# Patient Record
Sex: FEMALE | Race: WHITE | NOT HISPANIC OR LATINO | Employment: FULL TIME | URBAN - METROPOLITAN AREA
[De-identification: names, ages, dates, MRNs, and addresses within clinical notes are randomized per-mention and may not be internally consistent; named-entity substitution may affect disease eponyms.]

---

## 2017-02-07 ENCOUNTER — ANESTHESIA EVENT (OUTPATIENT)
Dept: GASTROENTEROLOGY | Facility: HOSPITAL | Age: 42
End: 2017-02-07

## 2017-02-07 ENCOUNTER — ANESTHESIA (OUTPATIENT)
Dept: GASTROENTEROLOGY | Facility: HOSPITAL | Age: 42
End: 2017-02-07

## 2017-02-07 PROCEDURE — 25010000002 PROPOFOL 1000 MG/ML EMULSION: Performed by: ANESTHESIOLOGY

## 2017-02-07 PROCEDURE — 25010000002 PROPOFOL 10 MG/ML EMULSION: Performed by: ANESTHESIOLOGY

## 2017-02-07 RX ORDER — PROPOFOL 10 MG/ML
VIAL (ML) INTRAVENOUS AS NEEDED
Status: DISCONTINUED | OUTPATIENT
Start: 2017-02-07 | End: 2017-02-07 | Stop reason: SURG

## 2017-02-07 RX ADMIN — PROPOFOL 100 MG: 10 INJECTION, EMULSION INTRAVENOUS at 09:31

## 2017-02-07 RX ADMIN — PROPOFOL 140 MCG/KG/MIN: 10 INJECTION, EMULSION INTRAVENOUS at 09:31

## 2017-02-07 RX ADMIN — PROPOFOL 75 MG: 10 INJECTION, EMULSION INTRAVENOUS at 09:40

## 2017-02-07 RX ADMIN — PROPOFOL 50 MG: 10 INJECTION, EMULSION INTRAVENOUS at 09:33

## 2017-02-07 NOTE — ANESTHESIA POSTPROCEDURE EVALUATION
Patient: Justine Lin    Procedure Summary     Date Anesthesia Start Anesthesia Stop Room / Location    02/07/17 0926 1005  [facility]       Procedure Diagnosis Surgeon Provider    COLONOSCOPY INTO T.I. WITH COLD BIOPSY POLYPECTOMY & HOT SNARE POLYPECTOMY WITH 3 CC BETTY INK INJECTION TO RECTOSIGMOID POLYPECTOMY SITE & RESOLUTION CLIP X 1 (N/A ) Blood in stool  (Blood in stool [K92.1]) MD Alex Herrera MD          Anesthesia Type: MAC  Last vitals  /54 (02/07/17 1005)    Temp      Pulse 58 (02/07/17 1005)   Resp 16 (02/07/17 1005)    SpO2 100 % (02/07/17 1005)      Post Anesthesia Care and Evaluation    Patient location during evaluation: PACU  Patient participation: complete - patient participated  Level of consciousness: awake and alert  Pain management: adequate  Airway patency: patent  Anesthetic complications: No anesthetic complications    Cardiovascular status: acceptable  Respiratory status: acceptable  Hydration status: acceptable

## 2017-02-07 NOTE — ANESTHESIA PREPROCEDURE EVALUATION
Anesthesia Evaluation     Patient summary reviewed and Nursing notes reviewed   no history of anesthetic complications:  NPO Status: > 8 hours   Airway   Mallampati: II  TM distance: >3 FB  Neck ROM: full  no difficulty expected  Dental - normal exam     Pulmonary - negative pulmonary ROS    breath sounds clear to auscultation  (-) shortness of breath, sleep apnea, decreased breath sounds, wheezes  Cardiovascular - normal exam  Exercise tolerance: good (4-7 METS)    Rhythm: regular  Rate: normal    (-) past MI, angina, CHF, orthopnea, PND, GONZALEZ, PVD      Neuro/Psych  (-) seizures, neuromuscular disease, TIA, CVA, dizziness/light headedness, weakness, numbness  GI/Hepatic/Renal/Endo    (+) obesity,  GERD,   (-) liver disease, renal disease, diabetes    Musculoskeletal (-) negative ROS    Abdominal  - normal exam   Substance History - negative use  (-) alcohol use, drug use     OB/GYN negative ob/gyn ROS         Other - negative ROS                                 Anesthesia Plan    ASA 2     MAC     intravenous induction   Anesthetic plan and risks discussed with patient.

## 2017-02-10 ENCOUNTER — TELEPHONE (OUTPATIENT)
Dept: GASTROENTEROLOGY | Facility: CLINIC | Age: 42
End: 2017-02-10

## 2017-02-10 NOTE — TELEPHONE ENCOUNTER
Call to pt.  Advise per Dr Jackson that 1 tubulovillous adenoma and 1 tubular adenoma were found on c/s and removed.  No cancer was identified.  In the asence of new symptoms (bloodd in stool, changes in stool habits, etc), it is recommend that next c/s be performed in 3 yrs.  If new symptoms develop, contact PCP for eval.    Pt verb understanding.    Message to Indigo GONZALEZ for 2/8/20 c/s recall.

## 2017-02-10 NOTE — TELEPHONE ENCOUNTER
----- Message from Nannette Jackson MD sent at 2/9/2017  6:57 PM EST -----  2/7/17 colonoscopy path results:  1 tubulovillous adenoma and 1 tubular adenoma were found on your colonoscopy and were removed.  No cancer was identified in the tissue.  In the absence of new symptoms (blood in stool, changes in stool habits, etc.), it is recommended that your next colonoscopy be performed in 3 years.  If you do develop new symptoms, please contact your primary care physician for evaluation.    Ref: Guidelines for Colonoscopy Surveillance After Screening and Polypectomy: A Consensus Update by the [place] Multi-Society Task Force on Colorectal Cancer.   Gastroenterology.  2012;143:844-857    Pls place in 3 year recall, thanks

## 2017-02-21 RX ORDER — OSELTAMIVIR PHOSPHATE 75 MG/1
75 CAPSULE ORAL DAILY
Qty: 10 CAPSULE | Refills: 0 | Status: SHIPPED | OUTPATIENT
Start: 2017-02-21 | End: 2018-02-14

## 2017-08-25 ENCOUNTER — OFFICE VISIT (OUTPATIENT)
Dept: FAMILY MEDICINE CLINIC | Facility: CLINIC | Age: 42
End: 2017-08-25

## 2017-08-25 VITALS
DIASTOLIC BLOOD PRESSURE: 80 MMHG | BODY MASS INDEX: 30.31 KG/M2 | WEIGHT: 200 LBS | HEIGHT: 68 IN | TEMPERATURE: 99.3 F | SYSTOLIC BLOOD PRESSURE: 130 MMHG | RESPIRATION RATE: 16 BRPM | OXYGEN SATURATION: 99 % | HEART RATE: 72 BPM

## 2017-08-25 DIAGNOSIS — Z80.3 FAMILY HISTORY OF BREAST CANCER IN FEMALE: ICD-10-CM

## 2017-08-25 DIAGNOSIS — R53.83 FATIGUE, UNSPECIFIED TYPE: Primary | ICD-10-CM

## 2017-08-25 DIAGNOSIS — Z80.3 FAMILY HISTORY OF BREAST CANCER IN MOTHER: ICD-10-CM

## 2017-08-25 DIAGNOSIS — Z80.42 FAMILY HISTORY OF PROSTATE CANCER: ICD-10-CM

## 2017-08-25 PROBLEM — Z01.419 WELL WOMAN EXAM WITH ROUTINE GYNECOLOGICAL EXAM: Status: ACTIVE | Noted: 2017-07-27

## 2017-08-25 PROBLEM — Z97.5 IUD CONTRACEPTION: Status: ACTIVE | Noted: 2017-07-27

## 2017-08-25 PROCEDURE — 99213 OFFICE O/P EST LOW 20 MIN: CPT | Performed by: FAMILY MEDICINE

## 2017-08-25 NOTE — PROGRESS NOTES
"Subjective   Justine Lin is a 41 y.o. female.     History of Present Illness   Justine is here w/ a swollen node under her Lt arm.  She states it was big enough that you could actually see it.  She recently had a Bartholin cyst  And was treated with antibiotic.  Justine states 4 yrs ago she had mono and since that time she has not \"felt right\".  She states she had talked to Dr Gibson about this several times and her lab work all seems fine.  She does not feel well.  She got a Mirena during this time period as well.    She also has an extensive hx of cancer in her family and would like genetic testing.      The following portions of the patient's history were reviewed and updated as appropriate: allergies, current medications, past medical history, past social history and problem list.    Review of Systems   Constitutional: Positive for fatigue and fever.   All other systems reviewed and are negative.      Objective   Physical Exam   Constitutional: She is oriented to person, place, and time. She appears well-developed.   HENT:   Head: Normocephalic and atraumatic.   Eyes: EOM are normal. Pupils are equal, round, and reactive to light.   Cardiovascular: Normal rate, regular rhythm and normal heart sounds.    Pulmonary/Chest: Effort normal and breath sounds normal.   Neurological: She is alert and oriented to person, place, and time.   Skin: Skin is warm and dry.   Nursing note and vitals reviewed.      Assessment/Plan   Diagnoses and all orders for this visit:    Fatigue, unspecified type  -     CBC (No Diff)  -     TSH  -     Vitamin B12 & Folate    Family history of breast cancer in mother  -     Ambulatory Referral to Genetics    Family history of breast cancer in female  -     Ambulatory Referral to Genetics    Family history of prostate cancer  -     Ambulatory Referral to Genetics               "

## 2017-08-29 ENCOUNTER — TELEPHONE (OUTPATIENT)
Dept: FAMILY MEDICINE CLINIC | Facility: CLINIC | Age: 42
End: 2017-08-29

## 2017-08-29 LAB
FOLATE SERPL-MCNC: 4.1 NG/ML
TSH SERPL DL<=0.005 MIU/L-ACNC: 0.8 UIU/ML (ref 0.45–4.5)
VIT B12 SERPL-MCNC: 312 PG/ML (ref 211–946)

## 2017-08-29 NOTE — TELEPHONE ENCOUNTER
Pt called to say that during her lab draw 8/25 she experienced sharp, shooting pain in the arm that was being used.  She states after the needle was removed she had a hot, burning sensation in that arm.  She states she called the on call MD over the weekend and was advised to go to the ER but did not feel like this was necessary,  She states ice, Ibu and ace bandage seemk to help.  She states sx seem to be getting better.  Advised pt that if sx worsen or fail to improve she should be seen

## 2017-08-31 LAB
HCT VFR BLD AUTO: NORMAL %
HGB BLD-MCNC: NORMAL G/DL
PLATELET # BLD AUTO: NORMAL 10*3/UL
RBC # BLD AUTO: NORMAL 10*6/UL
WBC # BLD AUTO: NORMAL X10E3/UL (ref 3.4–10.8)

## 2017-09-14 ENCOUNTER — TELEPHONE (OUTPATIENT)
Dept: FAMILY MEDICINE CLINIC | Facility: CLINIC | Age: 42
End: 2017-09-14

## 2017-09-15 ENCOUNTER — TELEPHONE (OUTPATIENT)
Dept: FAMILY MEDICINE CLINIC | Facility: CLINIC | Age: 42
End: 2017-09-15

## 2017-09-15 NOTE — TELEPHONE ENCOUNTER
Patient called and left a message stating that she still has decreased mobility from a recent blood draw at our office. She was contacted by [facility] about a CBC that was not completed. She state that she does not wish to pursue additional blood work at this time.

## 2018-02-14 ENCOUNTER — OFFICE VISIT (OUTPATIENT)
Dept: FAMILY MEDICINE CLINIC | Facility: CLINIC | Age: 43
End: 2018-02-14

## 2018-02-14 VITALS
BODY MASS INDEX: 31.22 KG/M2 | SYSTOLIC BLOOD PRESSURE: 110 MMHG | TEMPERATURE: 98.2 F | HEART RATE: 71 BPM | WEIGHT: 206 LBS | OXYGEN SATURATION: 98 % | HEIGHT: 68 IN | DIASTOLIC BLOOD PRESSURE: 70 MMHG

## 2018-02-14 DIAGNOSIS — J01.00 ACUTE NON-RECURRENT MAXILLARY SINUSITIS: Primary | ICD-10-CM

## 2018-02-14 DIAGNOSIS — R20.0 ARM NUMBNESS: ICD-10-CM

## 2018-02-14 PROCEDURE — 99213 OFFICE O/P EST LOW 20 MIN: CPT | Performed by: NURSE PRACTITIONER

## 2018-02-14 RX ORDER — AMOXICILLIN 875 MG/1
875 TABLET, COATED ORAL EVERY 12 HOURS SCHEDULED
Qty: 20 TABLET | Refills: 0 | Status: SHIPPED | OUTPATIENT
Start: 2018-02-14 | End: 2018-03-08

## 2018-02-14 NOTE — PROGRESS NOTES
"Justine Lin is a 42 y.o. female.Patinet presents with facial pain and ear pain that has been present for 3 days. Pain is sharp. Using Sudafed. She has had fatigue and nasal congestion for two weeks.    Patient states that she had a blood draw in August in our office and states that she still has pain and tinglng in the arm. Thinks she had her nerve knicked her nerve and is getting better    Assessment/Plan   Problem List Items Addressed This Visit     None      Visit Diagnoses     Acute non-recurrent maxillary sinusitis    -  Primary    Arm numbness                 No Follow-up on file.  There are no Patient Instructions on file for this visit.    Chief Complaint   Patient presents with   • Sinusitis     Social History   Substance Use Topics   • Smoking status: Never Smoker   • Smokeless tobacco: None   • Alcohol use No       History of Present Illness     The following portions of the patient's history were reviewed and updated as appropriate:PMHroutine: Social history , Allergies, Current Medications and Active Problem List    Review of Systems   Constitutional: Positive for fatigue.   HENT: Positive for sinus pain and sinus pressure.        Objective   Vitals:    02/14/18 0942   BP: 110/70   Pulse: 71   Temp: 98.2 °F (36.8 °C)   SpO2: 98%   Weight: 93.4 kg (206 lb)   Height: 172.7 cm (68\")     Body mass index is 31.32 kg/(m^2).  Physical Exam   Constitutional: She appears well-developed and well-nourished. No distress.   HENT:   Head: Normocephalic and atraumatic.   Right Ear: External ear normal.   Left Ear: External ear normal.   Op red with drainage  + pain and pressure to maxillary sinuses   Eyes: EOM are normal.   Neck: Neck supple. No thyromegaly present.   Cardiovascular: Normal rate, regular rhythm and normal heart sounds.    Pulmonary/Chest: Effort normal and breath sounds normal.   Musculoskeletal: Normal range of motion.   Neurological: She is alert.   Skin: Skin is warm.   Nursing note and " vitals reviewed.    Reviewed Data:  No visits with results within 1 Month(s) from this visit.  Latest known visit with results is:    Office Visit on 08/25/2017   Component Date Value Ref Range Status   • WBC 08/25/2017 Comment  3.4 - 10.8 x10E3/uL Final    Comment: [facility] will contact your patient to schedule a time for recollection.  Should we be unable to make contact with your patient, your office  will be notified.  Test not performed. Deterioration occurred during specimen handling.     • RBC 08/25/2017 CANCELED   Final-Edited    Comment: Test not performed    Result canceled by the ancillary     • Hemoglobin 08/25/2017 CANCELED   Final-Edited    Comment: Test not performed    Result canceled by the ancillary     • Hematocrit 08/25/2017 CANCELED   Final-Edited    Comment: Test not performed    Result canceled by the ancillary     • Platelets 08/25/2017 CANCELED   Final-Edited    Comment: Test not performed    Result canceled by the ancillary     • TSH 08/25/2017 0.795  0.450 - 4.500 uIU/mL Final   • Vitamin B-12 08/25/2017 312  211 - 946 pg/mL Final   • Folate 08/25/2017 4.1  >3.0 ng/mL Final    Comment: A serum folate concentration of less than 3.1 ng/mL is  considered to represent clinical deficiency.     otc magnesium for muscle pain

## 2018-03-01 ENCOUNTER — TELEPHONE (OUTPATIENT)
Dept: FAMILY MEDICINE CLINIC | Facility: CLINIC | Age: 43
End: 2018-03-01

## 2018-03-01 NOTE — TELEPHONE ENCOUNTER
Patient was seen for a sinus infection on 2/14/18. She states that she she finished her ATB and had two days that she felt better. Symptoms are returning. Please advise.

## 2018-03-07 ENCOUNTER — TELEPHONE (OUTPATIENT)
Dept: FAMILY MEDICINE CLINIC | Facility: CLINIC | Age: 43
End: 2018-03-07

## 2018-03-08 ENCOUNTER — OFFICE VISIT (OUTPATIENT)
Dept: FAMILY MEDICINE CLINIC | Facility: CLINIC | Age: 43
End: 2018-03-08

## 2018-03-08 VITALS
WEIGHT: 205 LBS | OXYGEN SATURATION: 99 % | TEMPERATURE: 98.6 F | HEART RATE: 84 BPM | BODY MASS INDEX: 31.07 KG/M2 | HEIGHT: 68 IN | SYSTOLIC BLOOD PRESSURE: 114 MMHG | DIASTOLIC BLOOD PRESSURE: 70 MMHG | RESPIRATION RATE: 16 BRPM

## 2018-03-08 DIAGNOSIS — R06.02 SHORTNESS OF BREATH: ICD-10-CM

## 2018-03-08 DIAGNOSIS — J30.2 CHRONIC SEASONAL ALLERGIC RHINITIS, UNSPECIFIED TRIGGER: ICD-10-CM

## 2018-03-08 DIAGNOSIS — J45.21 MILD INTERMITTENT REACTIVE AIRWAY DISEASE WITH ACUTE EXACERBATION: Primary | ICD-10-CM

## 2018-03-08 PROCEDURE — 96372 THER/PROPH/DIAG INJ SC/IM: CPT | Performed by: FAMILY MEDICINE

## 2018-03-08 PROCEDURE — 94640 AIRWAY INHALATION TREATMENT: CPT | Performed by: FAMILY MEDICINE

## 2018-03-08 PROCEDURE — 99214 OFFICE O/P EST MOD 30 MIN: CPT | Performed by: FAMILY MEDICINE

## 2018-03-08 RX ORDER — METHYLPREDNISOLONE 4 MG/1
TABLET ORAL
Qty: 21 TABLET | Refills: 0 | Status: SHIPPED | OUTPATIENT
Start: 2018-03-08 | End: 2018-03-14

## 2018-03-08 RX ORDER — AZITHROMYCIN 250 MG/1
TABLET, FILM COATED ORAL
Qty: 6 TABLET | Refills: 0 | Status: SHIPPED | OUTPATIENT
Start: 2018-03-08 | End: 2018-03-14

## 2018-03-08 RX ORDER — ALBUTEROL SULFATE 90 UG/1
2 AEROSOL, METERED RESPIRATORY (INHALATION) EVERY 4 HOURS PRN
Qty: 18 G | Refills: 4 | Status: SHIPPED | OUTPATIENT
Start: 2018-03-08 | End: 2018-03-14

## 2018-03-08 RX ORDER — TRIAMCINOLONE ACETONIDE 40 MG/ML
40 INJECTION, SUSPENSION INTRA-ARTICULAR; INTRAMUSCULAR ONCE
Status: COMPLETED | OUTPATIENT
Start: 2018-03-08 | End: 2018-03-08

## 2018-03-08 RX ORDER — ALBUTEROL SULFATE 2.5 MG/3ML
2.5 SOLUTION RESPIRATORY (INHALATION) EVERY 6 HOURS PRN
Status: DISCONTINUED | OUTPATIENT
Start: 2018-03-08 | End: 2018-03-14

## 2018-03-08 RX ADMIN — ALBUTEROL SULFATE 2.5 MG: 2.5 SOLUTION RESPIRATORY (INHALATION) at 12:44

## 2018-03-08 RX ADMIN — TRIAMCINOLONE ACETONIDE 40 MG: 40 INJECTION, SUSPENSION INTRA-ARTICULAR; INTRAMUSCULAR at 12:50

## 2018-03-08 NOTE — PROGRESS NOTES
Subjective   Justine Lin is a 42 y.o. female.     History of Present Illness   Justine is here w/ chest tightness, wheezing and cough.  She had antibiotic 3 weeks ago for sinusitis.  She states those sx got better and now her sx are in her chest.  She states nothing seems to make it better and talking seems to make it worse.  She states last night she had chest tightness and pain and contemplated the ER.  She states she has taken a multitude of otc medications w/o much relief. She has had this kind of illness 3 times over the past 5 years, she thinks, and she is feeling pretty miserable.     The following portions of the patient's history were reviewed and updated as appropriate: allergies, current medications, past medical history, past social history and problem list.    Review of Systems   Respiratory: Positive for cough, chest tightness, shortness of breath and wheezing.    All other systems reviewed and are negative.      Objective   Physical Exam   Constitutional: She is oriented to person, place, and time. She appears well-developed and well-nourished. No distress.   HENT:   Head: Normocephalic and atraumatic.   Right Ear: External ear normal.   Left Ear: External ear normal.   Nose: Nose normal.   Mouth/Throat: Oropharynx is clear and moist. No oropharyngeal exudate.   Eyes: Conjunctivae and EOM are normal. Pupils are equal, round, and reactive to light.   Neck: Normal range of motion.   Cardiovascular: Normal rate and regular rhythm.    Pulmonary/Chest: Effort normal and breath sounds normal. No stridor. No respiratory distress. She has no wheezes. She has no rales. She exhibits no tenderness.   Lymphadenopathy:     She has no cervical adenopathy.   Neurological: She is alert and oriented to person, place, and time.   Skin: Skin is warm and dry. No rash noted.   Nursing note and vitals reviewed.      Assessment/Plan   Justine was seen today for asthma.    Diagnoses and all orders for this  "visit:    Mild intermittent reactive airway disease with acute exacerbation  -     Ambulatory Referral to Allergy  -     azithromycin (ZITHROMAX) 250 MG tablet; Take 2 tablets the first day, then 1 tablet daily for 4 days.  -     MethylPREDNISolone (MEDROL, CORINA,) 4 MG tablet; Take as directed on package instructions.  -     albuterol (PROVENTIL HFA;VENTOLIN HFA) 108 (90 Base) MCG/ACT inhaler; Inhale 2 puffs Every 4 (Four) Hours As Needed for Wheezing.  -     triamcinolone acetonide (KENALOG-40) injection 40 mg; Inject 1 mL into the shoulder, thigh, or buttocks 1 (One) Time.    Shortness of breath  -     azithromycin (ZITHROMAX) 250 MG tablet; Take 2 tablets the first day, then 1 tablet daily for 4 days.  -     MethylPREDNISolone (MEDROL, CORINA,) 4 MG tablet; Take as directed on package instructions.  -     albuterol (PROVENTIL) nebulizer solution 0.083% 2.5 mg/3mL; Take 2.5 mg by nebulization Every 6 (Six) Hours As Needed for Shortness of Air.    Chronic seasonal allergic rhinitis, unspecified trigger  -     Ambulatory Referral to Allergy         After a breathing treatment and Kenolog injection, she began to feel faint and woozy.She was tachycardic, regular,  to 120.O2 sat was 98% on RA. She did not pass out or loose consciousness. After being monitored for about 10 minutes, she had a HR in the low 70's that was regular and she was talking, in good spirits but felt weak and \"drained\". She was able to ambulate without assistance,  and was able to go home with  at 11:37 am  I advised calling EMS if she has further problems at home. I have advised starting Zpak but waiting on steroid corina until tomorrow. Advised of possible side effects and advised to d/c and call me if she has problems.   "

## 2018-03-08 NOTE — TELEPHONE ENCOUNTER
Left message for her to call back with name of medication. Patient is coming in for appt. Today discussing asthma.

## 2018-03-08 NOTE — PATIENT INSTRUCTIONS
I have started you on a Zpak and a Medrol DosePak. Please take with food.Wait to start your Medrol jose tomorrow.  I have also prescribed an inhaler for you to use every 6 hrs as needed.  I have referred you to  for evaluation.  Please go to the ER if you get worse.

## 2018-03-09 ENCOUNTER — HOSPITAL ENCOUNTER (EMERGENCY)
Facility: HOSPITAL | Age: 43
Discharge: HOME | End: 2018-03-10
Attending: EMERGENCY MEDICINE | Admitting: EMERGENCY MEDICINE

## 2018-03-09 ENCOUNTER — APPOINTMENT (OUTPATIENT)
Dept: GENERAL RADIOLOGY | Facility: HOSPITAL | Age: 43
End: 2018-03-09

## 2018-03-09 DIAGNOSIS — R93.89 ABNORMAL CHEST CT: ICD-10-CM

## 2018-03-09 DIAGNOSIS — R06.00 DYSPNEA, UNSPECIFIED TYPE: Primary | ICD-10-CM

## 2018-03-09 LAB
ALBUMIN SERPL-MCNC: 4.7 G/DL (ref 3.5–5.2)
ALBUMIN/GLOB SERPL: 1.7 G/DL
ALP SERPL-CCNC: 98 U/L (ref 39–117)
ALT SERPL W P-5'-P-CCNC: 12 U/L (ref 1–33)
ANION GAP SERPL CALCULATED.3IONS-SCNC: 12.3 MMOL/L
AST SERPL-CCNC: 15 U/L (ref 1–32)
BASOPHILS # BLD AUTO: 0.02 10*3/MM3 (ref 0–0.2)
BASOPHILS NFR BLD AUTO: 0.2 % (ref 0–1.5)
BILIRUB SERPL-MCNC: 0.2 MG/DL (ref 0.1–1.2)
BUN BLD-MCNC: 13 MG/DL (ref 6–20)
BUN/CREAT SERPL: 17.8 (ref 7–25)
CALCIUM SPEC-SCNC: 9.1 MG/DL (ref 8.6–10.5)
CHLORIDE SERPL-SCNC: 104 MMOL/L (ref 98–107)
CO2 SERPL-SCNC: 24.7 MMOL/L (ref 22–29)
CREAT BLD-MCNC: 0.73 MG/DL (ref 0.57–1)
DEPRECATED RDW RBC AUTO: 41.3 FL (ref 37–54)
EOSINOPHIL # BLD AUTO: 0.01 10*3/MM3 (ref 0–0.7)
EOSINOPHIL NFR BLD AUTO: 0.1 % (ref 0.3–6.2)
ERYTHROCYTE [DISTWIDTH] IN BLOOD BY AUTOMATED COUNT: 13 % (ref 11.7–13)
GFR SERPL CREATININE-BSD FRML MDRD: 87 ML/MIN/1.73
GLOBULIN UR ELPH-MCNC: 2.8 GM/DL
GLUCOSE BLD-MCNC: 173 MG/DL (ref 65–99)
HCG SERPL QL: NEGATIVE
HCT VFR BLD AUTO: 42.6 % (ref 35.6–45.5)
HGB BLD-MCNC: 14.5 G/DL (ref 11.9–15.5)
HOLD SPECIMEN: NORMAL
HOLD SPECIMEN: NORMAL
IMM GRANULOCYTES # BLD: 0.05 10*3/MM3 (ref 0–0.03)
IMM GRANULOCYTES NFR BLD: 0.4 % (ref 0–0.5)
LYMPHOCYTES # BLD AUTO: 1.14 10*3/MM3 (ref 0.9–4.8)
LYMPHOCYTES NFR BLD AUTO: 8.6 % (ref 19.6–45.3)
MCH RBC QN AUTO: 29.5 PG (ref 26.9–32)
MCHC RBC AUTO-ENTMCNC: 34 G/DL (ref 32.4–36.3)
MCV RBC AUTO: 86.6 FL (ref 80.5–98.2)
MONOCYTES # BLD AUTO: 0.32 10*3/MM3 (ref 0.2–1.2)
MONOCYTES NFR BLD AUTO: 2.4 % (ref 5–12)
NEUTROPHILS # BLD AUTO: 11.73 10*3/MM3 (ref 1.9–8.1)
NEUTROPHILS NFR BLD AUTO: 88.3 % (ref 42.7–76)
NT-PROBNP SERPL-MCNC: 21.2 PG/ML (ref 5–450)
PLATELET # BLD AUTO: 249 10*3/MM3 (ref 140–500)
PMV BLD AUTO: 10 FL (ref 6–12)
POTASSIUM BLD-SCNC: 4.3 MMOL/L (ref 3.5–5.2)
PROT SERPL-MCNC: 7.5 G/DL (ref 6–8.5)
RBC # BLD AUTO: 4.92 10*6/MM3 (ref 3.9–5.2)
SODIUM BLD-SCNC: 141 MMOL/L (ref 136–145)
TROPONIN T SERPL-MCNC: <0.01 NG/ML (ref 0–0.03)
WBC NRBC COR # BLD: 13.27 10*3/MM3 (ref 4.5–10.7)
WHOLE BLOOD HOLD SPECIMEN: NORMAL
WHOLE BLOOD HOLD SPECIMEN: NORMAL

## 2018-03-09 PROCEDURE — 84484 ASSAY OF TROPONIN QUANT: CPT | Performed by: EMERGENCY MEDICINE

## 2018-03-09 PROCEDURE — 71046 X-RAY EXAM CHEST 2 VIEWS: CPT

## 2018-03-09 PROCEDURE — 84703 CHORIONIC GONADOTROPIN ASSAY: CPT | Performed by: EMERGENCY MEDICINE

## 2018-03-09 PROCEDURE — 36415 COLL VENOUS BLD VENIPUNCTURE: CPT

## 2018-03-09 PROCEDURE — 93010 ELECTROCARDIOGRAM REPORT: CPT | Performed by: INTERNAL MEDICINE

## 2018-03-09 PROCEDURE — 99284 EMERGENCY DEPT VISIT MOD MDM: CPT

## 2018-03-09 PROCEDURE — 85025 COMPLETE CBC W/AUTO DIFF WBC: CPT | Performed by: EMERGENCY MEDICINE

## 2018-03-09 PROCEDURE — 93005 ELECTROCARDIOGRAM TRACING: CPT

## 2018-03-09 PROCEDURE — 80053 COMPREHEN METABOLIC PANEL: CPT | Performed by: EMERGENCY MEDICINE

## 2018-03-09 PROCEDURE — 83880 ASSAY OF NATRIURETIC PEPTIDE: CPT | Performed by: EMERGENCY MEDICINE

## 2018-03-09 PROCEDURE — 85379 FIBRIN DEGRADATION QUANT: CPT | Performed by: PHYSICIAN ASSISTANT

## 2018-03-09 RX ORDER — SODIUM CHLORIDE 0.9 % (FLUSH) 0.9 %
10 SYRINGE (ML) INJECTION AS NEEDED
Status: DISCONTINUED | OUTPATIENT
Start: 2018-03-09 | End: 2018-03-10 | Stop reason: HOSPADM

## 2018-03-10 ENCOUNTER — APPOINTMENT (OUTPATIENT)
Dept: CT IMAGING | Facility: HOSPITAL | Age: 43
End: 2018-03-10

## 2018-03-10 VITALS
BODY MASS INDEX: 30.31 KG/M2 | SYSTOLIC BLOOD PRESSURE: 130 MMHG | HEART RATE: 65 BPM | DIASTOLIC BLOOD PRESSURE: 81 MMHG | RESPIRATION RATE: 16 BRPM | TEMPERATURE: 98 F | OXYGEN SATURATION: 96 % | HEIGHT: 68 IN | WEIGHT: 200 LBS

## 2018-03-10 LAB — D DIMER PPP FEU-MCNC: 1.66 MCGFEU/ML (ref 0–0.49)

## 2018-03-10 PROCEDURE — 0 IOPAMIDOL PER 1 ML: Performed by: EMERGENCY MEDICINE

## 2018-03-10 PROCEDURE — 71275 CT ANGIOGRAPHY CHEST: CPT

## 2018-03-10 RX ADMIN — IOPAMIDOL 95 ML: 755 INJECTION, SOLUTION INTRAVENOUS at 00:45

## 2018-03-10 NOTE — DISCHARGE INSTRUCTIONS
Activities as tolerated.  Follow up as discussed.  Continue the Zithromax.  Return to the ER for  chest pain, feeling lightheaded or passing out, worsening shortness of breath, any concerns.

## 2018-03-10 NOTE — ED NOTES
Pt reports increasing SOB and productive cough, states she's been sick since Moore's Day. No improvement with abx and steroids. Seen by her PMD yesterday but not feeling much improvement even after getting steroid shot.     Mariana Chin RN  03/09/18 0997

## 2018-03-10 NOTE — ED PROVIDER NOTES
EMERGENCY DEPARTMENT ENCOUNTER    Room Number:    Date seen:  3/10/2018  Time seen: 11:46 PM  PCP: Eduardo Cárdenas MD    HPI:  Chief complaint: SOA  Context:Justine Lin is a 42 y.o. female who presents to the ED with c/o SOA for the past week that started after coughing. Pt states that she has felt generally ill for the past several weeks, and that she was found to had a sinus and ear infection in February, and was place on amoxicillin with transient improvement. Pt states that she has seen her PCP for her symptoms, and was given albuterol and a steroid in the office with minimal improvement. She was prescribed an inhaler, medrol pack and z-pack. After taking the steroids and inhaler tonight she felt very anxious and felt more SOA. Pt denies a Hx of asthma or other lung disease. Pt denies recent trauma or surgeries, periods of immobilization, or Hx of VTE, hemoptysis, trips or travel, leg swelling or calf pain. She does have a Mirena IUD.    Onset:gradual   Duration: 1 week  Timing: constant   Character: SOA   Aggravating Factors: none stated  Alleviating Factors: none stated   Severity: moderate      ALLERGIES  Codeine    PAST MEDICAL HISTORY  Active Ambulatory Problems     Diagnosis Date Noted   • Well woman exam with routine gynecological exam 2017   • Wrist pain, right 2013   • IUD contraception 2017   • Seasonal allergies 2018     Resolved Ambulatory Problems     Diagnosis Date Noted   • No Resolved Ambulatory Problems     Past Medical History:   Diagnosis Date   • GERD (gastroesophageal reflux disease)    • Reactive airway disease    • Seasonal allergies        PAST SURGICAL HISTORY  Past Surgical History:   Procedure Laterality Date   •  SECTION     • CHOLECYSTECTOMY     • COLONOSCOPY N/A 2017    Procedure: COLONOSCOPY INTO T.I. WITH COLD BIOPSY POLYPECTOMY & HOT SNARE POLYPECTOMY WITH 3 CC BETTY INK INJECTION TO RECTOSIGMOID POLYPECTOMY SITE & RESOLUTION  CLIP X 1;  Surgeon: Nannette Jackson MD;  Location: [facility] ENDOSCOPY;  Service:    • TONSILLECTOMY         FAMILY HISTORY  Family History   Problem Relation Age of Onset   • Cancer Mother      Breast   • Diverticulitis Mother    • Cancer Father      Esophageal   • Cancer Brother      Prostate   • Cancer Maternal Aunt      Breast       SOCIAL HISTORY  Social History     Social History   • Marital status:      Spouse name: N/A   • Number of children: N/A   • Years of education: N/A     Occupational History   • Not on file.     Social History Main Topics   • Smoking status: Never Smoker   • Smokeless tobacco: Not on file   • Alcohol use No   • Drug use: No   • Sexual activity: Yes     Partners: Male     Other Topics Concern   • Not on file     Social History Narrative   • No narrative on file       REVIEW OF SYSTEMS  Review of Systems   Constitutional: Negative for chills and fever.   HENT: Negative.    Eyes: Negative.    Respiratory: Positive for chest tightness and shortness of breath (1 week).    Cardiovascular: Negative for chest pain and leg swelling.   Gastrointestinal: Negative for abdominal pain.   Genitourinary: Negative.    Musculoskeletal: Negative.    Skin: Negative.    Neurological: Negative.    Psychiatric/Behavioral: Negative.        PHYSICAL EXAM  ED Triage Vitals   Temp Heart Rate Resp BP SpO2   03/09/18 2218 03/09/18 2218 03/09/18 2218 03/09/18 2220 03/09/18 2218   97.3 °F (36.3 °C) 119 18 154/89 98 %      Temp src Heart Rate Source Patient Position BP Location FiO2 (%)   03/09/18 2218 03/09/18 2218 03/09/18 2220 03/09/18 2220 --   Tympanic Monitor Sitting Left arm      Physical Exam   Constitutional: She is oriented to person, place, and time and well-developed, well-nourished, and in no distress. No distress.   HENT:   Head: Normocephalic and atraumatic.   Right Ear: External ear normal.   Left Ear: External ear normal.   Nose: Nose normal.   Mouth/Throat: Mucous membranes are normal. No  oropharyngeal exudate or posterior oropharyngeal edema.   Eyes: Conjunctivae are normal.   Neck: Normal range of motion.   Cardiovascular: Normal rate and regular rhythm.    Pulmonary/Chest: Effort normal and breath sounds normal. She has no wheezes. She has no rhonchi. She has no rales.   Abdominal: Soft. She exhibits no distension. There is no tenderness.   Musculoskeletal: Normal range of motion. She exhibits no edema or tenderness (calf).   Negative Espinoza's sign   Neurological: She is alert and oriented to person, place, and time.   Skin: Skin is warm and dry.   Psychiatric: Affect normal.   Nursing note and vitals reviewed.      LAB RESULTS  Recent Results (from the past 24 hour(s))   Comprehensive Metabolic Panel    Collection Time: 03/09/18 10:36 PM   Result Value Ref Range    Glucose 173 (H) 65 - 99 mg/dL    BUN 13 6 - 20 mg/dL    Creatinine 0.73 0.57 - 1.00 mg/dL    Sodium 141 136 - 145 mmol/L    Potassium 4.3 3.5 - 5.2 mmol/L    Chloride 104 98 - 107 mmol/L    CO2 24.7 22.0 - 29.0 mmol/L    Calcium 9.1 8.6 - 10.5 mg/dL    Total Protein 7.5 6.0 - 8.5 g/dL    Albumin 4.70 3.50 - 5.20 g/dL    ALT (SGPT) 12 1 - 33 U/L    AST (SGOT) 15 1 - 32 U/L    Alkaline Phosphatase 98 39 - 117 U/L    Total Bilirubin 0.2 0.1 - 1.2 mg/dL    eGFR Non African Amer 87 >60 mL/min/1.73    Globulin 2.8 gm/dL    A/G Ratio 1.7 g/dL    BUN/Creatinine Ratio 17.8 7.0 - 25.0    Anion Gap 12.3 mmol/L   BNP    Collection Time: 03/09/18 10:36 PM   Result Value Ref Range    proBNP 21.2 5.0 - 450.0 pg/mL   Troponin    Collection Time: 03/09/18 10:36 PM   Result Value Ref Range    Troponin T <0.010 0.000 - 0.030 ng/mL   hCG, Serum, Qualitative    Collection Time: 03/09/18 10:36 PM   Result Value Ref Range    HCG Qualitative Negative Indeterminate, Negative   Light Blue Top    Collection Time: 03/09/18 10:36 PM   Result Value Ref Range    Extra Tube hold for add-on    Green Top (Gel)    Collection Time: 03/09/18 10:36 PM   Result Value Ref  Range    Extra Tube Hold for add-ons.    Lavender Top    Collection Time: 03/09/18 10:36 PM   Result Value Ref Range    Extra Tube hold for add-on    Gold Top - SST    Collection Time: 03/09/18 10:36 PM   Result Value Ref Range    Extra Tube Hold for add-ons.    CBC Auto Differential    Collection Time: 03/09/18 10:36 PM   Result Value Ref Range    WBC 13.27 (H) 4.50 - 10.70 10*3/mm3    RBC 4.92 3.90 - 5.20 10*6/mm3    Hemoglobin 14.5 11.9 - 15.5 g/dL    Hematocrit 42.6 35.6 - 45.5 %    MCV 86.6 80.5 - 98.2 fL    MCH 29.5 26.9 - 32.0 pg    MCHC 34.0 32.4 - 36.3 g/dL    RDW 13.0 11.7 - 13.0 %    RDW-SD 41.3 37.0 - 54.0 fl    MPV 10.0 6.0 - 12.0 fL    Platelets 249 140 - 500 10*3/mm3    Neutrophil % 88.3 (H) 42.7 - 76.0 %    Lymphocyte % 8.6 (L) 19.6 - 45.3 %    Monocyte % 2.4 (L) 5.0 - 12.0 %    Eosinophil % 0.1 (L) 0.3 - 6.2 %    Basophil % 0.2 0.0 - 1.5 %    Immature Grans % 0.4 0.0 - 0.5 %    Neutrophils, Absolute 11.73 (H) 1.90 - 8.10 10*3/mm3    Lymphocytes, Absolute 1.14 0.90 - 4.80 10*3/mm3    Monocytes, Absolute 0.32 0.20 - 1.20 10*3/mm3    Eosinophils, Absolute 0.01 0.00 - 0.70 10*3/mm3    Basophils, Absolute 0.02 0.00 - 0.20 10*3/mm3    Immature Grans, Absolute 0.05 (H) 0.00 - 0.03 10*3/mm3   D-dimer, Quantitative    Collection Time: 03/09/18 10:36 PM   Result Value Ref Range    D-Dimer, Quantitative 1.66 (H) 0.00 - 0.49 MCGFEU/mL       I ordered the above labs and reviewed the results    RADIOLOGY  CT Angiogram Chest With Contrast   Preliminary Result   1.  Examination is limited due to motion.   2.  No evidence for acute pulmonary embolism.    3.  Pulmonary congestion, mild congestive heart failure cannot be   excluded, please clinically correlate.                  XR Chest 2 View   Preliminary Result   No acute findings.                  I ordered the above noted radiological studies and reviewed the images on the PACS system.     MEDICATIONS GIVEN IN ER  Medications   sodium chloride 0.9 % flush 10 mL  "(not administered)   iopamidol (ISOVUE-370) 76 % injection 100 mL (95 mL Intravenous Given 3/10/18 0045)       EKG  Interpreted by ED Physician     PROCEDURES  Procedures      PROGRESS AND CONSULTS    Progress Notes:    ED Course   2354  Discussed the pt's labs with findings of elevated WBC, normal troponin, and normal BNP, as well as a negative CXR. Plan to assess a D-dimer, low suspicion and pretest probability. Discussed possible reactions to steroids, including anxiety and restlessness. Pt understands and agrees with the plan, and all questions were answered.  She declines any neb treatments here in the ER>    0020  Reviewed pt's history and workup with Dr. Morton.  After a bedside evaluation; Dr Morton agrees with the plan of care.    0110  Rechecked pt, who is resting comfortably. Discussed the pt's CTA with findings of vascular congestion. Conflicting due to unrmearkable chest x ray, low BNP, and no history of CHF. Plan to d/c the pt with a f/u with a cardiologist. Advised the pt to continue her current medications as tolerated, in specific that inhaler and steroids, and to finish the zpak compeltely. Pt understands and agrees with the plan, and all questions were answered.       Disposition vitals:  /75  Pulse 67  Temp 97.3 °F (36.3 °C) (Tympanic)   Resp 18  Ht 172.7 cm (68\")  Wt 90.7 kg (200 lb)  SpO2 95%  BMI 30.41 kg/m2      DIAGNOSIS  Final diagnoses:   Dyspnea, unspecified type   Abnormal chest CT       FOLLOW UP   [facility]  [address]  [place] [facility] [address] [phone]  Schedule an appointment as soon as possible for a visit      [facility]  [address] [facility] [address]  [facility] [id number] [phone]  Schedule an appointment as soon as possible for a visit        RX     Medication List      Notice     No changes were made to your prescriptions during this visit.        Documentation assistance provided by " heron Cerda for Teresa Garcia PA-C.  Information recorded by the jaseibsina was done at my direction and has been verified and validated by me.       Foster Cerda  03/10/18 0122       GEETA Lee  03/12/18 0736

## 2018-03-10 NOTE — ED PROVIDER NOTES
Patient presents to the ED c/o SOA with associated chest tightness that been constant for 1 week. She was placed on amoxicillin 1 month ago secondary to what was dx as a sinus infection and finished tx about 10 days ago. Patient has a h/o asthma but last episode was several years ago.     PHYSICAL EXAM    Constitutional: A&Ox3  HENT: No oropharangeal edema   Pulmonary: Patient is in mild respiratory distress with a muffled voice, but lungs are CTAB    Plan to review D-dimer.   I supervised care provided by the midlevel provider.    We have discussed this patient's history, physical exam, and treatment plan.   I have reviewed the note and personally saw and examined the patient and agree with the plan of care.    Documentation assistance provided by heron Jackson for .  Information recorded by the heron was done at my direction and has been verified and validated by me.           Zena Jackson  03/10/18 0009       Justice Morton MD  03/10/18 0129

## 2018-03-10 NOTE — ED TRIAGE NOTES
SOA since last Friday.  Worse today since approx 1600.  Saw PMD yesterday dx with acute exacerbation reactive airway disease.

## 2018-03-12 ENCOUNTER — TELEPHONE (OUTPATIENT)
Dept: FAMILY MEDICINE CLINIC | Facility: CLINIC | Age: 43
End: 2018-03-12

## 2018-03-12 NOTE — TELEPHONE ENCOUNTER
Please call back and tell her I had already reviewed her visit. I think follow up with cardiology and pulmonology is a very good idea.

## 2018-03-14 ENCOUNTER — OFFICE VISIT (OUTPATIENT)
Dept: CARDIOLOGY | Facility: CLINIC | Age: 43
End: 2018-03-14

## 2018-03-14 VITALS
WEIGHT: 203 LBS | BODY MASS INDEX: 30.77 KG/M2 | SYSTOLIC BLOOD PRESSURE: 124 MMHG | HEART RATE: 69 BPM | HEIGHT: 68 IN | DIASTOLIC BLOOD PRESSURE: 90 MMHG

## 2018-03-14 DIAGNOSIS — R09.89 PULMONARY CONGESTION: ICD-10-CM

## 2018-03-14 DIAGNOSIS — R06.02 SOB (SHORTNESS OF BREATH): Primary | ICD-10-CM

## 2018-03-14 PROCEDURE — 99203 OFFICE O/P NEW LOW 30 MIN: CPT | Performed by: INTERNAL MEDICINE

## 2018-03-14 PROCEDURE — 93000 ELECTROCARDIOGRAM COMPLETE: CPT | Performed by: INTERNAL MEDICINE

## 2018-03-14 NOTE — PROGRESS NOTES
Date of Office Visit: 18  Encounter Provider: Oswaldo Mena MD  Place of Service: [facility] CARDIOLOGY  Patient Name: Justine Lin  :1975      Chief Complaint   Patient presents with   • Shortness of Breath     History of Present Illness  Mrs Lin is a very pleasant 41 yo female no prior history of heart disease.  She now presents for evaluation of possible heart failure.  She apparently had a tear in sinus and upper respiratory infection in 2018 but she got progressively worse with*on antibiotics before and some attempted steroids but had some reactions to that but she got progressively more out of breath with coughing was maybe a little bit out of breath when she would lie down.  In she was out of breath to the point it was hard for her to complete sentences but again was coughing a lot and had a lot of upper respiratory congestion so she presented to the emergency room  and while there had a evaluation her chest x-ray was unremarkable but for some reason a d-dimer was ordered which was minimally elevated at 1.7 and up having a CT and drove her chest that showed no pulmonary embolus questionable pulmonary congestion normal heart size and no evidence of pericardial effusion she had a troponin then that was less than 0.1 and a proBNP that was 21 since that visit she is gotten progressively better now is just a little bit out of breath with increased activity she had chills before but no real fever and is again as stated she is pretty close to back to normal.  She denies palpitations, near-syncope or syncope.  Denies any prior history of rheumatic fever, heart attack, heart failure, or heart murmur.          Past Medical History:   Diagnosis Date   • GERD (gastroesophageal reflux disease)    • Reactive airway disease    • Seasonal allergies    • SOB (shortness of breath)          Past Surgical History:   Procedure Laterality Date   •   SECTION     • CHOLECYSTECTOMY     • COLONOSCOPY N/A 2017    Procedure: COLONOSCOPY INTO T.I. WITH COLD BIOPSY POLYPECTOMY & HOT SNARE POLYPECTOMY WITH 3 CC BETTY INK INJECTION TO RECTOSIGMOID POLYPECTOMY SITE & RESOLUTION CLIP X 1;  Surgeon: Nannette Jackson MD;  Location[facility] ENDOSCOPY;  Service:    • TONSILLECTOMY           Current Outpatient Prescriptions on File Prior to Visit   Medication Sig Dispense Refill   • levonorgestrel (MIRENA) 20 MCG/24HR IUD 1 each by Intrauterine route.     • [DISCONTINUED] albuterol (PROVENTIL HFA;VENTOLIN HFA) 108 (90 Base) MCG/ACT inhaler Inhale 2 puffs Every 4 (Four) Hours As Needed for Wheezing. 18 g 4   • [DISCONTINUED] azithromycin (ZITHROMAX) 250 MG tablet Take 2 tablets the first day, then 1 tablet daily for 4 days. 6 tablet 0   • [DISCONTINUED] diphenhydrAMINE (BENADRYL) 25 mg capsule Take 25 mg by mouth Every 6 (Six) Hours As Needed for itching.     • [DISCONTINUED] fluticasone (FLONASE) 50 MCG/ACT nasal spray 2 sprays each nostril qd 1 bottle 10   • [DISCONTINUED] MethylPREDNISolone (MEDROL, CORINA,) 4 MG tablet Take as directed on package instructions. 21 tablet 0     Current Facility-Administered Medications on File Prior to Visit   Medication Dose Route Frequency Provider Last Rate Last Dose   • [DISCONTINUED] albuterol (PROVENTIL) nebulizer solution 0.083% 2.5 mg/3mL  2.5 mg Nebulization Q6H PRN Eduardo Cárdenas MD   2.5 mg at 18 1244         Social History     Social History   • Marital status:      Spouse name: N/A   • Number of children: N/A   • Years of education: N/A     Occupational History   • Not on file.     Social History Main Topics   • Smoking status: Never Smoker   • Smokeless tobacco: Not on file   • Alcohol use No   • Drug use: No   • Sexual activity: Yes     Partners: Male     Other Topics Concern   • Not on file     Social History Narrative   • No narrative on file       Family History   Problem Relation Age of Onset   •  "Cancer Mother      Breast   • Diverticulitis Mother    • Heart disease Mother    • Cancer Father      Esophageal   • Cancer Brother      Prostate   • Cancer Maternal Aunt      Breast         Review of Systems   Constitution: Positive for malaise/fatigue. Negative for decreased appetite, diaphoresis, fever, weakness, weight gain and weight loss.   HENT: Negative for congestion, hearing loss, nosebleeds and tinnitus.    Eyes: Negative for blurred vision, double vision, vision loss in left eye, vision loss in right eye and visual disturbance.   Cardiovascular:        As noted in HPI   Respiratory:        As noted HPI   Endocrine: Negative for cold intolerance and heat intolerance.   Hematologic/Lymphatic: Negative for bleeding problem. Does not bruise/bleed easily.   Skin: Negative for color change, flushing, itching and rash.   Musculoskeletal: Negative for arthritis, back pain, joint pain, joint swelling, muscle weakness and myalgias.   Gastrointestinal: Negative for bloating, abdominal pain, constipation, diarrhea, dysphagia, heartburn, hematemesis, hematochezia, melena, nausea and vomiting.   Genitourinary: Negative for bladder incontinence, dysuria, frequency, nocturia and urgency.   Neurological: Negative for dizziness, focal weakness, headaches, light-headedness, loss of balance, numbness, paresthesias and vertigo.   Psychiatric/Behavioral: Negative for depression, memory loss and substance abuse.       Procedures      ECG 12 Lead  Date/Time: 3/14/2018 10:36 AM  Performed by: FLOYD ZIMMER  Authorized by: FLOYD ZIMMER   Comparison: compared with previous ECG   Similar to previous ECG  Rhythm: sinus rhythm  Rate: normal  QRS axis: normal  Clinical impression: normal ECG                Objective:    /90 (BP Location: Left arm)   Pulse 69   Ht 172.7 cm (68\")   Wt 92.1 kg (203 lb)   BMI 30.87 kg/m²        Physical Exam  Physical Exam   Constitutional: She is oriented to person, place, and time. " She appears well-developed and well-nourished. No distress.   HENT:   Head: Normocephalic.   Eyes: Conjunctivae are normal. Pupils are equal, round, and reactive to light. No scleral icterus.   Neck: Normal carotid pulses, no hepatojugular reflux and no JVD present. Carotid bruit is not present. No tracheal deviation, no edema and no erythema present. No thyromegaly present.   Cardiovascular: Normal rate, regular rhythm, S1 normal, S2 normal, normal heart sounds and intact distal pulses.   No extrasystoles are present. PMI is not displaced.  Exam reveals no gallop, no distant heart sounds and no friction rub.    No murmur heard.  Pulses:       Carotid pulses are 2+ on the right side, and 2+ on the left side.       Radial pulses are 2+ on the right side, and 2+ on the left side.        Femoral pulses are 2+ on the right side, and 2+ on the left side.       Dorsalis pedis pulses are 2+ on the right side, and 2+ on the left side.        Posterior tibial pulses are 2+ on the right side, and 2+ on the left side.   Pulmonary/Chest: Effort normal and breath sounds normal. No respiratory distress. She has no decreased breath sounds. She has no wheezes. She has no rhonchi. She has no rales. She exhibits no tenderness.   Abdominal: Soft. Bowel sounds are normal. She exhibits no distension and no mass. There is no hepatosplenomegaly. There is no tenderness. There is no rebound and no guarding.   Musculoskeletal: She exhibits no edema, tenderness or deformity.   Neurological: She is alert and oriented to person, place, and time.   Skin: Skin is warm and dry. No rash noted. She is not diaphoretic. No cyanosis or erythema. No pallor. Nails show no clubbing.   Psychiatric: She has a normal mood and affect. Her speech is normal and behavior is normal. Judgment and thought content normal.           Assessment:   1.  42-year-old female with questionable pulmonary congestion her CT scan was suggestive did not confirm that in addition  her proBNP was only 21.  She's had no real cardiac symptoms.  We discussed further evaluation with echocardiography however the fact that she's getting better I don't see a big need to do that unless she wanted to proceed she would prefer not to also therefore she'll call us if she is not continuing to improve or has any other symptoms.  And currently again today has no signs of heart failure and no real prior signs of heart failure.  2.  Upper respiratory infection probable pneumonia now resolved resolving.          Plan:

## 2018-03-19 ENCOUNTER — TELEPHONE (OUTPATIENT)
Dept: FAMILY MEDICINE CLINIC | Facility: CLINIC | Age: 43
End: 2018-03-19

## 2018-03-20 ENCOUNTER — TELEPHONE (OUTPATIENT)
Dept: FAMILY MEDICINE CLINIC | Facility: CLINIC | Age: 43
End: 2018-03-20

## 2018-03-21 ENCOUNTER — OFFICE VISIT (OUTPATIENT)
Dept: FAMILY MEDICINE CLINIC | Facility: CLINIC | Age: 43
End: 2018-03-21

## 2018-03-21 VITALS
WEIGHT: 201 LBS | DIASTOLIC BLOOD PRESSURE: 80 MMHG | SYSTOLIC BLOOD PRESSURE: 114 MMHG | BODY MASS INDEX: 30.46 KG/M2 | HEIGHT: 68 IN | OXYGEN SATURATION: 98 % | HEART RATE: 93 BPM | RESPIRATION RATE: 16 BRPM

## 2018-03-21 DIAGNOSIS — R06.02 SHORTNESS OF BREATH: Primary | ICD-10-CM

## 2018-03-21 DIAGNOSIS — J45.21 MILD INTERMITTENT REACTIVE AIRWAY DISEASE WITH ACUTE EXACERBATION: ICD-10-CM

## 2018-03-21 PROCEDURE — 99213 OFFICE O/P EST LOW 20 MIN: CPT | Performed by: FAMILY MEDICINE

## 2018-03-21 NOTE — PROGRESS NOTES
Subjective   Justine Lin is a 42 y.o. female.     History of Present Illness   Justine is here for follow up of her recent uri w/ chest congestion, cough and soa.  She states she is doing better.  She states she is having a productive cough and is fatigued but generally doing better.  She has upcoming appts with specialists. She notes that her cardiac evaluation was normal.    The following portions of the patient's history were reviewed and updated as appropriate: allergies, current medications, past medical history, past social history and problem list.    Review of Systems   Respiratory: Positive for cough, chest tightness and shortness of breath.    All other systems reviewed and are negative.      Objective   Physical Exam   Constitutional: She is oriented to person, place, and time. She appears well-developed and well-nourished. No distress.   HENT:   Head: Normocephalic and atraumatic.   Eyes: Conjunctivae and EOM are normal. Pupils are equal, round, and reactive to light.   Neck: Normal range of motion.   Cardiovascular: Normal rate and regular rhythm.    Pulmonary/Chest: Effort normal and breath sounds normal. No stridor. No respiratory distress. She has no wheezes.   Lymphadenopathy:     She has no cervical adenopathy.   Neurological: She is alert and oriented to person, place, and time.   Skin: Skin is warm and dry.   Nursing note and vitals reviewed.      Assessment/Plan   Justine was seen today for bronchitis.    Diagnoses and all orders for this visit:    Shortness of breath  Mild intermittent reactive airway disease with acute exacerbation  She is slowly improving. Cardiac eval was normal. She has upcoming appts with pulmonology and allergist.   I have returned her to work next week.       Patient Instructions   Please let me know if you need me. Keep me posted.

## 2018-03-29 ENCOUNTER — TELEPHONE (OUTPATIENT)
Dept: FAMILY MEDICINE CLINIC | Facility: CLINIC | Age: 43
End: 2018-03-29

## 2018-03-29 NOTE — TELEPHONE ENCOUNTER
Pt called.  Saw allergist yesterday.  She states she was told she may have myocarditis.  She states he suggested more blood work.  Pt states she does not want to do anymore testing.  Pt states she would like to rest and hopefully continue to improve.

## 2018-03-29 NOTE — TELEPHONE ENCOUNTER
Please call Justine and tell her my best advice is to follow up with a cardiologist. Myocarditis is something that needs to be monitored closely and if that is what she has, it is important to confirm and get appropriate care and follow up.

## 2018-03-30 ENCOUNTER — TELEPHONE (OUTPATIENT)
Dept: CARDIOLOGY | Facility: CLINIC | Age: 43
End: 2018-03-30

## 2018-03-30 DIAGNOSIS — R06.02 SHORTNESS OF BREATH: Primary | ICD-10-CM

## 2018-03-30 NOTE — TELEPHONE ENCOUNTER
Patient called to report she has seen an Allergist /mmunologist.  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  Patient would like to discuss this with you.  Please call her at the number below.  Thank you/ SAMRA    Patient's phone number is [phone].

## 2021-04-06 ENCOUNTER — BULK ORDERING (OUTPATIENT)
Dept: CASE MANAGEMENT | Facility: OTHER | Age: 46
End: 2021-04-06

## 2021-04-06 DIAGNOSIS — Z23 IMMUNIZATION DUE: ICD-10-CM

## 2024-09-26 NOTE — PATIENT INSTRUCTIONS
Please let me know if you need me. Keep me posted.

## 2025-03-26 ENCOUNTER — OFFICE VISIT (OUTPATIENT)
Dept: INTERNAL MEDICINE | Facility: CLINIC | Age: 50
End: 2025-03-26
Payer: COMMERCIAL

## 2025-03-26 VITALS
HEIGHT: 68 IN | SYSTOLIC BLOOD PRESSURE: 112 MMHG | DIASTOLIC BLOOD PRESSURE: 82 MMHG | WEIGHT: 221 LBS | OXYGEN SATURATION: 96 % | RESPIRATION RATE: 18 BRPM | BODY MASS INDEX: 33.49 KG/M2 | HEART RATE: 81 BPM

## 2025-03-26 DIAGNOSIS — Z13.220 SCREENING FOR HYPERLIPIDEMIA: ICD-10-CM

## 2025-03-26 DIAGNOSIS — E66.09 CLASS 1 OBESITY DUE TO EXCESS CALORIES WITHOUT SERIOUS COMORBIDITY WITH BODY MASS INDEX (BMI) OF 33.0 TO 33.9 IN ADULT: ICD-10-CM

## 2025-03-26 DIAGNOSIS — Z12.11 ENCOUNTER FOR SCREENING COLONOSCOPY: ICD-10-CM

## 2025-03-26 DIAGNOSIS — E66.811 CLASS 1 OBESITY DUE TO EXCESS CALORIES WITHOUT SERIOUS COMORBIDITY WITH BODY MASS INDEX (BMI) OF 33.0 TO 33.9 IN ADULT: ICD-10-CM

## 2025-03-26 DIAGNOSIS — Z13.1 SCREENING FOR DIABETES MELLITUS: ICD-10-CM

## 2025-03-26 DIAGNOSIS — Z76.89 ENCOUNTER TO ESTABLISH CARE WITH NEW DOCTOR: Primary | ICD-10-CM

## 2025-03-26 DIAGNOSIS — Z11.59 NEED FOR HEPATITIS C SCREENING TEST: ICD-10-CM

## 2025-03-26 DIAGNOSIS — Z86.0100 PERSONAL HISTORY OF COLON POLYPS, UNSPECIFIED: ICD-10-CM

## 2025-03-26 PROBLEM — Z01.419 WELL WOMAN EXAM WITH ROUTINE GYNECOLOGICAL EXAM: Status: RESOLVED | Noted: 2017-07-27 | Resolved: 2025-03-26

## 2025-03-26 PROBLEM — D36.9 TUBULOVILLOUS ADENOMA: Status: RESOLVED | Noted: 2020-03-13 | Resolved: 2025-03-26

## 2025-03-26 PROBLEM — L40.9 PSORIASIS: Status: ACTIVE | Noted: 2019-09-16

## 2025-03-26 PROBLEM — Z97.5 IUD CONTRACEPTION: Status: RESOLVED | Noted: 2017-07-27 | Resolved: 2025-03-26

## 2025-03-26 PROBLEM — Z82.61 FAMILY HISTORY OF RHEUMATOID ARTHRITIS: Status: ACTIVE | Noted: 2019-09-16

## 2025-03-26 PROBLEM — D36.9 TUBULOVILLOUS ADENOMA: Status: ACTIVE | Noted: 2020-03-13

## 2025-03-26 NOTE — PROGRESS NOTES
Chief Complaint  Establish care, weight management, personal history of colonic polyps    Subjective        Justine Lin presents to clinic today to establish care with a new provider. Patient was previously following with a [facility] provider. We also discussed weight loss management today.  History of Present Illness  She is due for a colonoscopy and has expressed interest in seeking recommendations for a new provider. Her last colonoscopy was performed on 03/13/2020, during which a large polyp was identified and removed. She has a history of polyps, with the first one detected at the age of 41.    She has been experiencing significant weight gain over the past 11 to 12 years, despite maintaining a regular exercise regimen and adhering to a vegetarian diet. Attempts to modify her diet by reducing carbohydrate intake and increasing protein consumption have not yielded any noticeable results. She has also tried various weight loss programs such as Omada, Noom, and Weight Watchers, but these have only resulted in minimal weight loss of approximately 8 pounds. She has sought the assistance of a health  and undergone blood work, which ruled out thyroid-related issues. Hormonal IUD though to be contributing, however removal of this also did not result in any weight gain.     She reports discomfort due to her weight gain and believes it may be exacerbating her plantar fasciitis. She recalls a weight gain of 20 pounds following a bout of mononucleosis and speculates that this may have altered her metabolism. She is considering medication as a potential solution to her weight issues. Her diet consists primarily of whole foods, vegetables, fruits, and proteins, and she avoids sugar-sweetened beverages and fast food. She has a family history of obesity and cancer, particularly breast cancer. She is ready to consider medical options for assistance in weight loss at this time.     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    Objective   Vital Signs:  /82 (BP Location: Left arm, Patient Position: Sitting, Cuff Size: Adult)   Pulse 81   Resp 18   Ht 172.7 cm (68\")   Wt 100 kg (221 lb)   SpO2 96%   BMI 33.60 kg/m²   Estimated body mass index is 33.6 kg/m² as calculated from the following:    Height as of this encounter: 172.7 cm (68\").    Weight as of this encounter: 100 kg (221 lb).    BMI is >= 30 and <35. (Class 1 Obesity). The following options were offered after discussion;: referral to a weight management program    Physical Exam  Constitutional:       General: She is not in acute distress.     Appearance: Normal appearance.   Pulmonary:      Effort: Pulmonary effort is normal. No respiratory distress.   Musculoskeletal:         General: No swelling or deformity. Normal range of motion.   Skin:     General: Skin is warm and dry.   Neurological:      General: No focal deficit present.      Mental Status: She is oriented to person, place, and time. Mental status is at baseline.   Psychiatric:         Mood and Affect: Mood normal.         Behavior: Behavior normal.        Result Review :             No current outpatient medications on file.      Assessment and Plan   Diagnoses and all orders for this visit:    1. Encounter to establish care with new doctor (Primary)  -     Comprehensive Metabolic Panel  -     CBC & Differential    2. Encounter for screening colonoscopy  -     Ambulatory Referral For Screening Colonoscopy    3. Personal history of colon polyps, unspecified  -     Ambulatory Referral For Screening Colonoscopy    4. Class 1 obesity due to excess calories without serious comorbidity with body mass index (BMI) of 33.0 to 33.9 in adult  -     Ambulatory Referral to Bariatric Surgery  -     Comprehensive Metabolic Panel  -     CBC & Differential    5. Need for hepatitis C screening test  -     Hepatitis C Antibody    6. Screening for " hyperlipidemia  -     Lipid Panel    7. Screening for diabetes mellitus  -     Hemoglobin A1c           Follow Up   Return in about 3 months (around 6/26/2025) for annual physical.    Patient was given instructions and counseling regarding her condition or for health maintenance advice. Please see specific information pulled into the AVS if appropriate.     Shameka Reveles MD    Patient or patient representative verbalized consent for the use of Ambient Listening during the visit with  Shameka Reveles MD for chart documentation. 3/26/2025  13:41 EDT

## 2025-03-27 ENCOUNTER — PATIENT ROUNDING (BHMG ONLY) (OUTPATIENT)
Dept: INTERNAL MEDICINE | Facility: CLINIC | Age: 50
End: 2025-03-27
Payer: COMMERCIAL

## 2025-03-27 LAB
ALBUMIN SERPL-MCNC: 4.4 G/DL (ref 3.5–5.2)
ALBUMIN/GLOB SERPL: 1.6 G/DL
ALP SERPL-CCNC: 125 U/L (ref 39–117)
ALT SERPL-CCNC: 17 U/L (ref 1–33)
AST SERPL-CCNC: 18 U/L (ref 1–32)
BASOPHILS # BLD AUTO: 0.06 10*3/MM3 (ref 0–0.2)
BASOPHILS NFR BLD AUTO: 0.8 % (ref 0–1.5)
BILIRUB SERPL-MCNC: <0.2 MG/DL (ref 0–1.2)
BUN SERPL-MCNC: 14 MG/DL (ref 6–20)
BUN/CREAT SERPL: 22.2 (ref 7–25)
CALCIUM SERPL-MCNC: 9.1 MG/DL (ref 8.6–10.5)
CHLORIDE SERPL-SCNC: 104 MMOL/L (ref 98–107)
CHOLEST SERPL-MCNC: 169 MG/DL (ref 0–200)
CO2 SERPL-SCNC: 25.7 MMOL/L (ref 22–29)
CREAT SERPL-MCNC: 0.63 MG/DL (ref 0.57–1)
EGFRCR SERPLBLD CKD-EPI 2021: 108.9 ML/MIN/1.73
EOSINOPHIL # BLD AUTO: 0.18 10*3/MM3 (ref 0–0.4)
EOSINOPHIL NFR BLD AUTO: 2.4 % (ref 0.3–6.2)
ERYTHROCYTE [DISTWIDTH] IN BLOOD BY AUTOMATED COUNT: 13.5 % (ref 12.3–15.4)
GLOBULIN SER CALC-MCNC: 2.8 GM/DL
GLUCOSE SERPL-MCNC: 96 MG/DL (ref 65–99)
HBA1C MFR BLD: 5.5 % (ref 4.8–5.6)
HCT VFR BLD AUTO: 41.9 % (ref 34–46.6)
HCV IGG SERPL QL IA: NON REACTIVE
HDLC SERPL-MCNC: 30 MG/DL (ref 40–60)
HGB BLD-MCNC: 14.6 G/DL (ref 12–15.9)
IMM GRANULOCYTES # BLD AUTO: 0.02 10*3/MM3 (ref 0–0.05)
IMM GRANULOCYTES NFR BLD AUTO: 0.3 % (ref 0–0.5)
LDLC SERPL CALC-MCNC: 63 MG/DL (ref 0–100)
LYMPHOCYTES # BLD AUTO: 1.71 10*3/MM3 (ref 0.7–3.1)
LYMPHOCYTES NFR BLD AUTO: 23.2 % (ref 19.6–45.3)
MCH RBC QN AUTO: 29.5 PG (ref 26.6–33)
MCHC RBC AUTO-ENTMCNC: 34.8 G/DL (ref 31.5–35.7)
MCV RBC AUTO: 84.6 FL (ref 79–97)
MONOCYTES # BLD AUTO: 0.34 10*3/MM3 (ref 0.1–0.9)
MONOCYTES NFR BLD AUTO: 4.6 % (ref 5–12)
NEUTROPHILS # BLD AUTO: 5.06 10*3/MM3 (ref 1.7–7)
NEUTROPHILS NFR BLD AUTO: 68.7 % (ref 42.7–76)
NRBC BLD AUTO-RTO: 0 /100 WBC (ref 0–0.2)
PLATELET # BLD AUTO: 285 10*3/MM3 (ref 140–450)
POTASSIUM SERPL-SCNC: 4.2 MMOL/L (ref 3.5–5.2)
PROT SERPL-MCNC: 7.2 G/DL (ref 6–8.5)
RBC # BLD AUTO: 4.95 10*6/MM3 (ref 3.77–5.28)
SODIUM SERPL-SCNC: 142 MMOL/L (ref 136–145)
TRIGL SERPL-MCNC: 495 MG/DL (ref 0–150)
VLDLC SERPL CALC-MCNC: 76 MG/DL (ref 5–40)
WBC # BLD AUTO: 7.37 10*3/MM3 (ref 3.4–10.8)

## 2025-04-14 ENCOUNTER — TELEPHONE (OUTPATIENT)
Dept: GASTROENTEROLOGY | Facility: CLINIC | Age: 50
End: 2025-04-14
Payer: COMMERCIAL

## 2025-04-14 DIAGNOSIS — Z12.11 ENCOUNTER FOR SCREENING FOR MALIGNANT NEOPLASM OF COLON: Primary | ICD-10-CM

## 2025-04-14 RX ORDER — SODIUM CHLORIDE, SODIUM LACTATE, POTASSIUM CHLORIDE, CALCIUM CHLORIDE 600; 310; 30; 20 MG/100ML; MG/100ML; MG/100ML; MG/100ML
30 INJECTION, SOLUTION INTRAVENOUS CONTINUOUS
OUTPATIENT
Start: 2025-04-14 | End: 2025-04-14

## 2025-04-14 NOTE — TELEPHONE ENCOUNTER
ALEX Goodrich for COLONOSCOPY on 7/22/25  arrive at 12:30  . Sent prep instructions to pt my chart....[facility]

## 2025-04-14 NOTE — TELEPHONE ENCOUNTER
Last colonoscopy 3/12/20 - under care everywhere    Personal and family hx polyps  No family hx cx    Asa or blood thinners:  None    List medications:  Zyrtec  Benadryl    OA form scanned in media

## 2025-05-18 ENCOUNTER — HOSPITAL ENCOUNTER (EMERGENCY)
Facility: HOSPITAL | Age: 50
Discharge: HOME | End: 2025-05-18
Attending: EMERGENCY MEDICINE | Admitting: EMERGENCY MEDICINE
Payer: COMMERCIAL

## 2025-05-18 VITALS
BODY MASS INDEX: 34.86 KG/M2 | HEART RATE: 71 BPM | SYSTOLIC BLOOD PRESSURE: 136 MMHG | DIASTOLIC BLOOD PRESSURE: 95 MMHG | HEIGHT: 68 IN | RESPIRATION RATE: 16 BRPM | WEIGHT: 230 LBS | OXYGEN SATURATION: 95 % | TEMPERATURE: 98.7 F

## 2025-05-18 DIAGNOSIS — R21 RASH: Primary | ICD-10-CM

## 2025-05-18 LAB
ALBUMIN SERPL-MCNC: 4 G/DL (ref 3.5–5.2)
ALBUMIN/GLOB SERPL: 1.5 G/DL
ALP SERPL-CCNC: 107 U/L (ref 39–117)
ALT SERPL W P-5'-P-CCNC: 22 U/L (ref 1–33)
ANION GAP SERPL CALCULATED.3IONS-SCNC: 11 MMOL/L (ref 5–15)
AST SERPL-CCNC: 19 U/L (ref 1–32)
BASOPHILS # BLD AUTO: 0.05 10*3/MM3 (ref 0–0.2)
BASOPHILS NFR BLD AUTO: 0.5 % (ref 0–1.5)
BILIRUB SERPL-MCNC: 0.2 MG/DL (ref 0–1.2)
BUN SERPL-MCNC: 12 MG/DL (ref 6–20)
BUN/CREAT SERPL: 16.9 (ref 7–25)
CALCIUM SPEC-SCNC: 8.9 MG/DL (ref 8.6–10.5)
CHLORIDE SERPL-SCNC: 107 MMOL/L (ref 98–107)
CO2 SERPL-SCNC: 21 MMOL/L (ref 22–29)
CREAT SERPL-MCNC: 0.71 MG/DL (ref 0.57–1)
DEPRECATED RDW RBC AUTO: 43.5 FL (ref 37–54)
EGFRCR SERPLBLD CKD-EPI 2021: 104.4 ML/MIN/1.73
EOSINOPHIL # BLD AUTO: 0.24 10*3/MM3 (ref 0–0.4)
EOSINOPHIL NFR BLD AUTO: 2.5 % (ref 0.3–6.2)
ERYTHROCYTE [DISTWIDTH] IN BLOOD BY AUTOMATED COUNT: 13.6 % (ref 12.3–15.4)
GLOBULIN UR ELPH-MCNC: 2.6 GM/DL
GLUCOSE SERPL-MCNC: 95 MG/DL (ref 65–99)
HCT VFR BLD AUTO: 41 % (ref 34–46.6)
HGB BLD-MCNC: 13.8 G/DL (ref 12–15.9)
IMM GRANULOCYTES # BLD AUTO: 0.02 10*3/MM3 (ref 0–0.05)
IMM GRANULOCYTES NFR BLD AUTO: 0.2 % (ref 0–0.5)
LYMPHOCYTES # BLD AUTO: 1.83 10*3/MM3 (ref 0.7–3.1)
LYMPHOCYTES NFR BLD AUTO: 19.1 % (ref 19.6–45.3)
MCH RBC QN AUTO: 29.2 PG (ref 26.6–33)
MCHC RBC AUTO-ENTMCNC: 33.7 G/DL (ref 31.5–35.7)
MCV RBC AUTO: 86.9 FL (ref 79–97)
MONOCYTES # BLD AUTO: 0.46 10*3/MM3 (ref 0.1–0.9)
MONOCYTES NFR BLD AUTO: 4.8 % (ref 5–12)
NEUTROPHILS NFR BLD AUTO: 6.98 10*3/MM3 (ref 1.7–7)
NEUTROPHILS NFR BLD AUTO: 72.9 % (ref 42.7–76)
NRBC BLD AUTO-RTO: 0 /100 WBC (ref 0–0.2)
PLATELET # BLD AUTO: 241 10*3/MM3 (ref 140–450)
PMV BLD AUTO: 9.5 FL (ref 6–12)
POTASSIUM SERPL-SCNC: 3.9 MMOL/L (ref 3.5–5.2)
PROT SERPL-MCNC: 6.6 G/DL (ref 6–8.5)
RBC # BLD AUTO: 4.72 10*6/MM3 (ref 3.77–5.28)
SODIUM SERPL-SCNC: 139 MMOL/L (ref 136–145)
WBC NRBC COR # BLD AUTO: 9.58 10*3/MM3 (ref 3.4–10.8)

## 2025-05-18 PROCEDURE — 80053 COMPREHEN METABOLIC PANEL: CPT | Performed by: EMERGENCY MEDICINE

## 2025-05-18 PROCEDURE — 99283 EMERGENCY DEPT VISIT LOW MDM: CPT

## 2025-05-18 PROCEDURE — 85025 COMPLETE CBC W/AUTO DIFF WBC: CPT | Performed by: EMERGENCY MEDICINE

## 2025-05-18 PROCEDURE — 36415 COLL VENOUS BLD VENIPUNCTURE: CPT

## 2025-05-18 RX ORDER — METHYLPREDNISOLONE 4 MG/1
TABLET ORAL
Qty: 21 TABLET | Refills: 0 | Status: SHIPPED | OUTPATIENT
Start: 2025-05-18

## 2025-05-18 NOTE — ED PROVIDER NOTES
EMERGENCY DEPARTMENT ENCOUNTER    Room Number:  03/03  Date of encounter:  5/18/2025  PCP: Shameka Reveles MD  Historian: Patient  Full history not obtainable due to: None    HPI:  Chief Complaint: Rash, lightheadedness    Context: Justine Lin is a 49 y.o. female with a PMH significant for allergies, psoriasis, who presents to the ED c/o rash and lightheadedness.  Patient says that she thinks she was bit by an ant 1 week prior, noticed a vesicular rash on her right wrist, says that this has gotten better with Benadryl but she recently noticed a rash on her right flank in the past few days.  Patient notes that she is also felt lightheaded, she says that in the past she has felt lightheaded after getting stung by bees, was concerned and wanted to come to the emergency department to check in.  Patient also endorses full body tingling.  Patient denies any dizziness, changes to hearing or vision, falls, chest pain, shortness of breath, still feels lightheaded falling down in bed at this time.      MEDICAL RECORD REVIEW:    Upon review of the medical record it appears the patient was evaluated 3/6/2025.  The patient had a normal hepatitis C antibody and hemoglobin A1c.    PAST MEDICAL HISTORY    Active Ambulatory Problems     Diagnosis Date Noted    Seasonal allergies 03/08/2018    Class 1 obesity due to excess calories without serious comorbidity with body mass index (BMI) of 33.0 to 33.9 in adult 07/20/2018    Family history of rheumatoid arthritis 09/16/2019    Psoriasis 09/16/2019    Personal history of colon polyps, unspecified 03/26/2025    Encounter for screening for malignant neoplasm of colon 04/14/2025     Resolved Ambulatory Problems     Diagnosis Date Noted    Well woman exam with routine gynecological exam 07/27/2017    Wrist pain, right 04/07/2013    IUD contraception 07/27/2017    SOB (shortness of breath)     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 WITH COLD BIOPSY POLYPECTOMY & HOT SNARE POLYPECTOMY WITH 3 CC BETTY INK INJECTION TO RECTOSIGMOID POLYPECTOMY SITE & RESOLUTION CLIP X 1;  Surgeon: Nannette Jackson MD;  Location: [facility] ENDOSCOPY;  Service:     TONSILLECTOMY           FAMILY HISTORY  Family History   Problem Relation Age of Onset    Cancer Mother         Breast    Diverticulitis Mother     Heart disease Mother     Cancer Father         Esophageal    Cancer Brother         Prostate    Cancer Maternal Aunt         Breast         SOCIAL HISTORY  Social History     Socioeconomic History    Marital status:    Tobacco Use    Smoking status: Never    Smokeless tobacco: Never   Vaping Use    Vaping status: Never Used   Substance and Sexual Activity    Alcohol use: No    Drug use: No    Sexual activity: Yes     Partners: Male         ALLERGIES  Codeine        REVIEW OF SYSTEMS    All systems reviewed and marked as negative except as listed in HPI     PHYSICAL EXAM    I have reviewed the triage vital signs and nursing notes.    ED Triage Vitals   Temp Heart Rate Resp BP SpO2   25 1749 25 1749 25 1749 25 1805 25 1749   98.3 °F (36.8 °C) 95 16 147/96 97 %      Temp src Heart Rate Source Patient Position BP Location FiO2 (%)   -- -- -- -- --              Physical Exam  Vitals and nursing note reviewed. Exam conducted with a chaperone present.   Constitutional:       General: She is not in acute distress.     Appearance: Normal appearance.   Cardiovascular:      Rate and Rhythm: Normal rate and regular rhythm.   Pulmonary:      Effort: Pulmonary effort is normal. No respiratory distress.      Breath sounds: Normal breath sounds.   Skin:     Findings: Rash present. Rash is  macular.          Neurological:      Mental Status: She is alert and oriented to person, place, and time.   Psychiatric:         Mood and Affect: Mood normal.         Behavior: Behavior normal.         Vital signs and nursing notes reviewed.      LAB RESULTS  Recent Results (from the past 24 hours)   Comprehensive Metabolic Panel    Collection Time: 05/18/25  7:40 PM    Specimen: Arm, Right; Blood   Result Value Ref Range    Glucose 95 65 - 99 mg/dL    BUN 12 6 - 20 mg/dL    Creatinine 0.71 0.57 - 1.00 mg/dL    Sodium 139 136 - 145 mmol/L    Potassium 3.9 3.5 - 5.2 mmol/L    Chloride 107 98 - 107 mmol/L    CO2 21.0 (L) 22.0 - 29.0 mmol/L    Calcium 8.9 8.6 - 10.5 mg/dL    Total Protein 6.6 6.0 - 8.5 g/dL    Albumin 4.0 3.5 - 5.2 g/dL    ALT (SGPT) 22 1 - 33 U/L    AST (SGOT) 19 1 - 32 U/L    Alkaline Phosphatase 107 39 - 117 U/L    Total Bilirubin 0.2 0.0 - 1.2 mg/dL    Globulin 2.6 gm/dL    A/G Ratio 1.5 g/dL    BUN/Creatinine Ratio 16.9 7.0 - 25.0    Anion Gap 11.0 5.0 - 15.0 mmol/L    eGFR 104.4 >60.0 mL/min/1.73   CBC Auto Differential    Collection Time: 05/18/25  7:40 PM    Specimen: Arm, Right; Blood   Result Value Ref Range    WBC 9.58 3.40 - 10.80 10*3/mm3    RBC 4.72 3.77 - 5.28 10*6/mm3    Hemoglobin 13.8 12.0 - 15.9 g/dL    Hematocrit 41.0 34.0 - 46.6 %    MCV 86.9 79.0 - 97.0 fL    MCH 29.2 26.6 - 33.0 pg    MCHC 33.7 31.5 - 35.7 g/dL    RDW 13.6 12.3 - 15.4 %    RDW-SD 43.5 37.0 - 54.0 fl    MPV 9.5 6.0 - 12.0 fL    Platelets 241 140 - 450 10*3/mm3    Neutrophil % 72.9 42.7 - 76.0 %    Lymphocyte % 19.1 (L) 19.6 - 45.3 %    Monocyte % 4.8 (L) 5.0 - 12.0 %    Eosinophil % 2.5 0.3 - 6.2 %    Basophil % 0.5 0.0 - 1.5 %    Immature Grans % 0.2 0.0 - 0.5 %    Neutrophils, Absolute 6.98 1.70 - 7.00 10*3/mm3    Lymphocytes, Absolute 1.83 0.70 - 3.10 10*3/mm3    Monocytes, Absolute 0.46 0.10 - 0.90 10*3/mm3    Eosinophils, Absolute 0.24 0.00 - 0.40 10*3/mm3    Basophils, Absolute 0.05 0.00 - 0.20 10*3/mm3     Immature Grans, Absolute 0.02 0.00 - 0.05 10*3/mm3    nRBC 0.0 0.0 - 0.2 /100 WBC       Ordered the above labs and independently reviewed the results.        RADIOLOGY  No Radiology Exams Resulted Within Past 24 Hours    I ordered the above noted radiological studies. Independently reviewed by me and discussed with radiologist.  See dictation above for official radiology interpretation.      MEDICATIONS GIVEN IN ER    Medications - No data to display      PROGRESS, DATA ANALYSIS, CONSULTS, AND MEDICAL DECISION MAKING    All labs have been independently interpreted by me.  All radiology studies have been interpreted by me.  Discussion below represents my analysis of pertinent findings related to patient's condition, differential diagnosis, treatment plan and final disposition.    Patient did not have evidence of a systemic infection, did not have other metabolic causes of lightheadedness.  Patient denied significant lightheadedness on the emergency department, was able to ambulate without significant headedness and was hemodynamically stable.  Is being discharged on steroids and follow-up with her primary care provider    - Chronic or social conditions impacting care: None      DIFFERENTIAL DIAGNOSIS INCLUDE BUT NOT LIMITED TO: Allergic contact dermatitis, irritant contact dermatitis, cellulitis, bug bite, psoriasis, dermatitis      Orders placed during this visit:  Orders Placed This Encounter   Procedures    Comprehensive Metabolic Panel    CBC Auto Differential    CBC & Differential         ED Course as of 05/18/25 2356   Sun May 18, 2025   2116 Glucose: 95 [CV]   2116 BUN: 12 [CV]   2116 Creatinine: 0.71 [CV]   2116 Sodium: 139 [CV]   2116 Potassium: 3.9 [CV]   2116 WBC: 9.58 [CV]   2116 Hemoglobin: 13.8 [CV]   2117 No significant electrolyte derangement, no sign of a systemic infection.-Patient is having allergic contact dermatitis versus allergic reaction to insect bite.  Will be discharged with oral steroids.   [CV]      ED Course User Index  [CV] Adarsh Galeana PA-C       AS OF 23:56 EDT VITALS:    BP - 136/95  HR - 71  TEMP - 98.7 °F (37.1 °C) (Oral)  02 SATS - 95%    I rechecked the patient.  I discussed the patient's labs, radiology findings (including all incidental findings), diagnosis, and plan for discharge.  A repeat exam reveals no new worrisome changes from my initial exam findings.  The patient understands that the fact that they are being discharged does not denote that nothing is abnormal, it indicates that no clinical emergency is present and that they must follow-up as directed in order to properly maintain their health.  Follow-up instructions (specifically listed below) and return to ER precautions were given at this time.  I specifically instructed the patient to follow-up with their PCP.  The patient understands and agrees with the plan, and is ready for discharge.  All questions answered.    Shameka Reveles MD  [address] [facility] [address] [facility]  [address]  [place] [address]  [phone]    Call in 2 days  As needed         Medication List        New Prescriptions      methylPREDNISolone 4 MG dose pack  Commonly known as: MEDROL  Take as directed on package instructions.               Where to Get Your Medications        These medications were sent to [facility]/pharmacy #[id number] - [place], [place] - [address] AT [facility] - [phone] [facility] [phone]   [address], [place] [id number]      Phone: [phone]   methylPREDNISolone 4 MG dose pack           DIAGNOSIS  Final diagnoses:   Rash         DISPOSITION  Discharge    Pt masked in first look. I wore a surgical mask throughout my encounters with the pt. I performed hand hygiene on entry into the pt room and upon exit.     Dictated utilizing Dragon dictation     Note Disclaimer: At [facility], we believe that sharing information builds trust and better relationships. You are receiving this note because you recently visited  [facility]. It is possible you will see health information before a provider has talked with you about it. This kind of information can be easy to misunderstand. To help you fully understand what it means for your health, we urge you to discuss this note with your provider.      Adarsh Galeana PA-C  05/18/25 [id number]

## 2025-05-19 NOTE — DISCHARGE INSTRUCTIONS
Please follow-up with your PCP.    Rest.  Increase fluid intake.      Although you are being discharged in the ED today, I encourage you to return for worsening symptoms.  Things can, and do, change such a treatment at home with medication may not be adequate.  Specifically I recommend returning for chest pain or discomfort, difficulty breathing, persistent vomiting or difficulty holding down liquids or medications, fever > 102.0 F,  or any other worsening or alarming symptoms.     Take meds as prescribed.     You have been evaluated in the emergency department for your presenting symptoms and deemed appropriate for discharge home.  Understand that your health care does not end here today.  It is important that your primary care physician be made aware of your visit.  I recommend calling your primary care provider in the next 48 hours to arrange follow-up care.  Your primary care provider needs to review your treatment and recovery to ensure that no further treatment is necessary.  Additional testing or procedures may be necessary as an outpatient at the discretion of your primary care provider.    I also recommend following up with your PCP for recheck of your blood pressure and treatment as needed.

## 2025-05-19 NOTE — ED PROVIDER NOTES
MD ATTESTATION NOTE      Brief HPI: Patient plaints of acute rash and lightheadedness.  She was working in her garden a week ago when she noticed a small bite on her right wrist.  She then developed an itchy rash around this area.  Few days later, she noticed a scattered rash on her right abdomen.  Earlier today, she felt somewhat lightheaded and tingly.  Denies facial/lip swelling, trouble swallowing, shortness of breath, chest pain, or syncope.    PHYSICAL EXAM    GENERAL: Awake, alert, and oriented x 3.  Resting comfortably in no acute distress  HENT: nares patent  EYES: no scleral icterus  CV: regular rhythm, normal rate  RESPIRATORY: normal effort, CTAB  ABDOMEN: soft, nontender  MUSCULOSKELETAL: no deformity, extremities are nontender  NEURO: moves all extremities, follows commands, speech is clear and fluent, no facial droop  PSYCH:  calm, cooperative  SKIN: There are a few patchy urticarial type lesions on the right abdomen.  There is a faintly erythematous fine macular rash on the volar aspect of the right wrist.  There is no drainage, fluctuance, or lymphangitis    Vital signs and nursing notes reviewed.        Plan: Patient complains of an acute rash and lightheadedness.  Blood pressure is normal.  Labs are unremarkable.  Symptoms are likely due to contact dermatitis.  Plan is for symptomatic treatment.       Jone Soni MD  05/18/25 2026

## 2025-06-27 ENCOUNTER — OFFICE VISIT (OUTPATIENT)
Dept: INTERNAL MEDICINE | Facility: CLINIC | Age: 50
End: 2025-06-27
Payer: COMMERCIAL

## 2025-06-27 VITALS
DIASTOLIC BLOOD PRESSURE: 84 MMHG | OXYGEN SATURATION: 97 % | WEIGHT: 233 LBS | HEART RATE: 66 BPM | RESPIRATION RATE: 18 BRPM | BODY MASS INDEX: 35.31 KG/M2 | SYSTOLIC BLOOD PRESSURE: 132 MMHG | HEIGHT: 68 IN

## 2025-06-27 DIAGNOSIS — E66.812 CLASS 2 SEVERE OBESITY DUE TO EXCESS CALORIES WITH SERIOUS COMORBIDITY AND BODY MASS INDEX (BMI) OF 35.0 TO 35.9 IN ADULT: ICD-10-CM

## 2025-06-27 DIAGNOSIS — Z00.00 ANNUAL PHYSICAL EXAM: Primary | ICD-10-CM

## 2025-06-27 DIAGNOSIS — M72.2 PLANTAR FASCIITIS OF LEFT FOOT: ICD-10-CM

## 2025-06-27 DIAGNOSIS — E78.1 HYPERTRIGLYCERIDEMIA: ICD-10-CM

## 2025-06-27 DIAGNOSIS — E66.01 CLASS 2 SEVERE OBESITY DUE TO EXCESS CALORIES WITH SERIOUS COMORBIDITY AND BODY MASS INDEX (BMI) OF 35.0 TO 35.9 IN ADULT: ICD-10-CM

## 2025-06-27 PROBLEM — Z12.11 ENCOUNTER FOR SCREENING FOR MALIGNANT NEOPLASM OF COLON: Status: RESOLVED | Noted: 2025-04-14 | Resolved: 2025-06-27

## 2025-06-27 LAB
CHOLEST SERPL-MCNC: 173 MG/DL (ref 0–200)
HDLC SERPL-MCNC: 31 MG/DL (ref 40–60)
LDLC SERPL CALC-MCNC: 94 MG/DL (ref 0–100)
TRIGL SERPL-MCNC: 283 MG/DL (ref 0–150)
VLDLC SERPL CALC-MCNC: 48 MG/DL (ref 5–40)

## 2025-06-27 RX ORDER — MELOXICAM 7.5 MG/1
7.5 TABLET ORAL DAILY
COMMUNITY

## 2025-06-27 NOTE — PROGRESS NOTES
"Chief Complaint  Annual Exam, high triglyceride levels    Subjective        Justine Lin presents to clinic today for her annual physical exam.     Patient noted to have quite elevated triglyceride levels on last check, up to 495. She says that her triglycerides have been elevated in the past, but not to this extent. She is fasting today for repeat.     A referral to weight loss clinic was made during our last visit, however unfortunately her insurance did not cover this service. She has since started a program called [employer], which is an robert on her phone, in which she was able to speak with a specialist and a nutritionist about the best weight loss plan for her. She states that after consultation with this service, she has decided to try intermittent fasting, and reports having lost a few pounds since starting this program. She states that her activity level is limited due to ongoing left plantar fasciitis, for which she follows with podiatry and recently underwent her first steroid injection.     Due for colonoscopy screening, which she states is scheduled for a few weeks.   History of Present Illness      Objective   Vital Signs:  /84 (BP Location: Left arm, Patient Position: Sitting, Cuff Size: Adult)   Pulse 66   Resp 18   Ht 172.7 cm (68\")   Wt 106 kg (233 lb)   SpO2 97%   BMI 35.43 kg/m²   Estimated body mass index is 35.43 kg/m² as calculated from the following:    Height as of this encounter: 172.7 cm (68\").    Weight as of this encounter: 106 kg (233 lb).      Physical Exam  Constitutional:       General: She is not in acute distress.     Appearance: Normal appearance.   HENT:      Right Ear: Tympanic membrane and ear canal normal.      Left Ear: Tympanic membrane and ear canal normal.      Mouth/Throat:      Mouth: Mucous membranes are moist.      Pharynx: Oropharynx is clear. No posterior oropharyngeal erythema.   Eyes:      Extraocular Movements: Extraocular movements intact.      " Conjunctiva/sclera: Conjunctivae normal.   Cardiovascular:      Rate and Rhythm: Normal rate and regular rhythm.      Heart sounds: No murmur heard.  Pulmonary:      Effort: Pulmonary effort is normal.      Breath sounds: Normal breath sounds. No wheezing.   Musculoskeletal:         General: No swelling or deformity. Normal range of motion.   Lymphadenopathy:      Cervical: No cervical adenopathy.   Skin:     General: Skin is warm and dry.   Neurological:      General: No focal deficit present.      Mental Status: She is oriented to person, place, and time. Mental status is at baseline.   Psychiatric:         Mood and Affect: Mood normal.         Behavior: Behavior normal.     Result Review :  The following data was reviewed by: Shameka Reveles MD on 06/27/2025:    Data reviewed : Consultant notes Podiatry note 6/20/2025         Current Outpatient Medications:     meloxicam (MOBIC) 7.5 MG tablet, Take 1 tablet by mouth Daily., Disp: , Rfl:       Assessment and Plan   Diagnoses and all orders for this visit:    1. Annual physical exam (Primary)    2. Hypertriglyceridemia  -     Lipid panel    3. Class 2 severe obesity due to excess calories with serious comorbidity and body mass index (BMI) of 35.0 to 35.9 in adult  Assessment & Plan:  Working with a dietician.   Doing intermittent fasting.   Monitor.      4. Plantar fasciitis of left foot  Assessment & Plan:  Following with podiatry.         I personally reviewed PMH, PSxH, family history, allergies, social history, and medication list. I recommend that patient stay UTD on all age- and population-recommended vaccinations, avoid tobacco use, and use moderation if drinking alcohol (no more than 7 drinks per week). I provided information on healthy eating habits and exercise in Wrap Up tab.          Follow Up   Return in about 1 year (around 6/27/2026) for annual physical, lab work.    Patient was given instructions and counseling regarding her condition or for  health maintenance advice. Please see specific information pulled into the AVS if appropriate.     Shameka Reveles MD

## 2025-07-21 PROBLEM — Z12.11 ENCOUNTER FOR SCREENING FOR MALIGNANT NEOPLASM OF COLON: Status: ACTIVE | Noted: 2025-04-14

## 2025-07-25 ENCOUNTER — TELEPHONE (OUTPATIENT)
Dept: GASTROENTEROLOGY | Facility: CLINIC | Age: 50
End: 2025-07-25
Payer: COMMERCIAL

## 2025-07-25 DIAGNOSIS — Z12.11 ENCOUNTER FOR SCREENING FOR MALIGNANT NEOPLASM OF COLON: Primary | ICD-10-CM

## 2025-07-28 NOTE — TELEPHONE ENCOUNTER
ALEX Box for COLONOSCOPY on 10/1/25  arrive at 9:30  . [facility] prep [facility]